# Patient Record
Sex: MALE | Race: WHITE | NOT HISPANIC OR LATINO | Employment: FULL TIME | URBAN - METROPOLITAN AREA
[De-identification: names, ages, dates, MRNs, and addresses within clinical notes are randomized per-mention and may not be internally consistent; named-entity substitution may affect disease eponyms.]

---
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
## 2021-09-30 ENCOUNTER — OFFICE VISIT (OUTPATIENT)
Dept: FAMILY MEDICINE | Facility: CLINIC | Age: 21
End: 2021-09-30
Payer: COMMERCIAL

## 2021-09-30 VITALS
HEART RATE: 78 BPM | SYSTOLIC BLOOD PRESSURE: 119 MMHG | TEMPERATURE: 98.5 F | BODY MASS INDEX: 23.89 KG/M2 | OXYGEN SATURATION: 95 % | WEIGHT: 176.4 LBS | DIASTOLIC BLOOD PRESSURE: 66 MMHG | HEIGHT: 72 IN | RESPIRATION RATE: 16 BRPM

## 2021-09-30 DIAGNOSIS — Z00.00 ANNUAL PHYSICAL EXAM: Primary | ICD-10-CM

## 2021-09-30 DIAGNOSIS — Z23 HIGH PRIORITY FOR 2019-NCOV VACCINE: ICD-10-CM

## 2021-09-30 PROCEDURE — 91301 COVID-19,PF,MODERNA (18+ YRS): CPT | Performed by: INTERNAL MEDICINE

## 2021-09-30 PROCEDURE — 0011A COVID-19,PF,MODERNA (18+ YRS): CPT | Performed by: INTERNAL MEDICINE

## 2021-09-30 PROCEDURE — 99385 PREV VISIT NEW AGE 18-39: CPT | Mod: 25 | Performed by: INTERNAL MEDICINE

## 2021-09-30 ASSESSMENT — ENCOUNTER SYMPTOMS
DIZZINESS: 0
COUGH: 0
WEAKNESS: 1
HEADACHES: 1
FEVER: 0
DIARRHEA: 0
NAUSEA: 0
NERVOUS/ANXIOUS: 0
JOINT SWELLING: 0
MYALGIAS: 0
SHORTNESS OF BREATH: 0
HEMATURIA: 0
PALPITATIONS: 0
DYSURIA: 0
ABDOMINAL PAIN: 0
SORE THROAT: 0
CONSTIPATION: 0
HEARTBURN: 1
ARTHRALGIAS: 0
PARESTHESIAS: 0
CHILLS: 0
FREQUENCY: 0
HEMATOCHEZIA: 0
EYE PAIN: 0

## 2021-09-30 ASSESSMENT — MIFFLIN-ST. JEOR: SCORE: 1838.15

## 2021-09-30 NOTE — PROGRESS NOTES
SUBJECTIVE:   CC: Pete Polanco is an 21 year old male who presents for preventative health visit.     Pete is a 21 year old male who presented to the clinic for a physical.  2 weeks ago he had an episode of dizziness/shakiness that lasted about 10 hours.  He reports that his dad gets the same feeling and is usually because of low blood sugars, of note his dad is not a diabetic or on insulin.  During the episode of patient ate a sugar cookie and was feeling much better.  He reports that he sometimes skips meals and is snacking throughout the day.  He works manual labor.  His medical history insignificant.  No past surgical history.  He is a smoker, smokes half pack a day for multiple years, on and off with quitting, not interested in quitting at this time.  He drinks 6-8 alcoholic drinks every week.  He is sexually active and does not use condoms.      Patient has been advised of split billing requirements and indicates understanding: Yes  Healthy Habits:     Getting at least 3 servings of Calcium per day:  NO    Bi-annual eye exam:  Yes    Dental care twice a year:  NO    Sleep apnea or symptoms of sleep apnea:  None    Diet:  Regular (no restrictions)    Frequency of exercise:  1 day/week    Duration of exercise:  45-60 minutes    Taking medications regularly:  Not Applicable    Medication side effects:  Not applicable    PHQ-2 Total Score: 0    Additional concerns today:  No      Today's PHQ-2 Score:   PHQ-2 ( 1999 Pf[employer]) 9/30/2021   Q1: Little interest or pleasure in doing things 0   Q2: Feeling down, depressed or hopeless 0   PHQ-2 Score 0   Q1: Little interest or pleasure in doing things Not at all   Q2: Feeling down, depressed or hopeless Not at all   PHQ-2 Score 0     Abuse: Current or Past(Physical, Sexual or Emotional)- No  Do you feel safe in your environment? Yes    Social History     Tobacco Use     Smoking status: Current Some Day Smoker     Smokeless tobacco: Never Used   Substance Use Topics      Alcohol use: No         Alcohol Use 9/30/2021   Prescreen: >3 drinks/day or >7 drinks/week? No       Last PSA: No results found for: PSA    Reviewed orders with patient. Reviewed health maintenance and updated orders accordingly - Yes  Lab work is in process    Reviewed and updated as needed this visit by clinical staff               Reviewed and updated as needed this visit by Provider                Review of Systems   Constitutional: Negative for chills and fever.   HENT: Negative for congestion, ear pain, hearing loss and sore throat.    Eyes: Negative for pain and visual disturbance.   Respiratory: Negative for cough and shortness of breath.    Cardiovascular: Negative for chest pain, palpitations and peripheral edema.   Gastrointestinal: Positive for heartburn. Negative for abdominal pain, constipation, diarrhea, hematochezia and nausea.   Genitourinary: Negative for discharge, dysuria, frequency, genital sores, hematuria, impotence and urgency.   Musculoskeletal: Negative for arthralgias, joint swelling and myalgias.   Skin: Negative for rash.   Neurological: Positive for weakness and headaches. Negative for dizziness and paresthesias.   Psychiatric/Behavioral: Negative for mood changes. The patient is not nervous/anxious.        OBJECTIVE:   There were no vitals taken for this visit.    Physical Exam  Vitals reviewed.   Constitutional:       Appearance: Normal appearance.   HENT:      Right Ear: Tympanic membrane normal. There is impacted cerumen.      Left Ear: Tympanic membrane normal. There is impacted cerumen.      Mouth/Throat:      Mouth: Mucous membranes are moist.      Pharynx: Oropharynx is clear. No oropharyngeal exudate or posterior oropharyngeal erythema.   Cardiovascular:      Rate and Rhythm: Normal rate and regular rhythm.      Heart sounds: Normal heart sounds. No murmur heard.   No gallop.    Pulmonary:      Effort: Pulmonary effort is normal. No respiratory distress.      Breath sounds: No  "wheezing or rales.   Abdominal:      General: Abdomen is flat. There is no distension.      Palpations: Abdomen is soft.      Tenderness: There is no abdominal tenderness. There is no guarding.   Musculoskeletal:         General: Normal range of motion.      Cervical back: Normal range of motion. No rigidity.      Right lower leg: No edema.      Left lower leg: No edema.   Lymphadenopathy:      Cervical: No cervical adenopathy.   Skin:     General: Skin is warm and dry.   Neurological:      General: No focal deficit present.      Mental Status: He is alert and oriented to person, place, and time.   Psychiatric:         Mood and Affect: Mood normal.         Behavior: Behavior normal.         ASSESSMENT/PLAN:       ICD-10-CM    1. Annual physical exam  Z00.00 CBC with Platelets (Today)     COMPREHENSIVE METABOLIC PANEL     Lipid Profile     TSH with free T4 reflex       Annual physical exam  Lab work ordered.  Pete will get his first Covid shot today.  Discussed about quitting smoking and safe sex practices.  -     CBC with Platelets (Today); Future  -     COMPREHENSIVE METABOLIC PANEL; Future  -     Lipid Profile; Future  -     TSH with free T4 reflex; Future        Patient has been advised of split billing requirements and indicates understanding: Yes  COUNSELING:   Reviewed preventive health counseling, as reflected in patient instructions       Safe sex practices/STD prevention    Estimated body mass index is 21.18 kg/m  as calculated from the following:    Height as of 3/21/18: 1.88 m (6' 2\").    Weight as of 3/21/18: 74.8 kg (165 lb).      He reports that he has been smoking. He has never used smokeless tobacco.  Tobacco Cessation Action Plan:   Information offered: Patient not interested at this time      Counseling Resources:  ATP IV Guidelines  Pooled Cohorts Equation Calculator  FRAX Risk Assessment  ICSI Preventive Guidelines  Dietary Guidelines for Americans, 2010  USDA's MyPlate  ASA Prophylaxis  Lung CA " Screening    RONEL REEDER MD  [facility]

## 2021-09-30 NOTE — PATIENT INSTRUCTIONS
You were seen in [facility] today for an annual physical (preventive) visit. I ordered blood work which includes blood count, electrolytes, kidney function, thyroid function, lipid profile and some screening tests. 8-12 hours of fasting is required for checking lipids. You will get a mychart message or a call about test results.

## 2021-10-28 ENCOUNTER — IMMUNIZATION (OUTPATIENT)
Dept: NURSING | Facility: CLINIC | Age: 21
End: 2021-10-28
Attending: INTERNAL MEDICINE
Payer: COMMERCIAL

## 2021-10-28 DIAGNOSIS — Z23 HIGH PRIORITY FOR 2019-NCOV VACCINE: ICD-10-CM

## 2021-10-28 PROCEDURE — 0012A COVID-19,PF,MODERNA (18+ YRS PRIMARY SERIES .5ML): CPT

## 2021-10-28 PROCEDURE — 99207 PR NO CHARGE NURSE ONLY: CPT

## 2021-10-28 PROCEDURE — 91301 COVID-19,PF,MODERNA (18+ YRS PRIMARY SERIES .5ML): CPT

## 2023-07-01 ENCOUNTER — NURSE TRIAGE (OUTPATIENT)
Dept: NURSING | Facility: CLINIC | Age: 23
End: 2023-07-01
Payer: COMMERCIAL

## 2023-07-01 ENCOUNTER — OFFICE VISIT (OUTPATIENT)
Dept: URGENT CARE | Facility: URGENT CARE | Age: 23
End: 2023-07-01
Payer: COMMERCIAL

## 2023-07-01 VITALS
OXYGEN SATURATION: 99 % | SYSTOLIC BLOOD PRESSURE: 115 MMHG | TEMPERATURE: 97.8 F | WEIGHT: 183 LBS | DIASTOLIC BLOOD PRESSURE: 62 MMHG | HEART RATE: 57 BPM | BODY MASS INDEX: 24.82 KG/M2

## 2023-07-01 DIAGNOSIS — R21 RASH AND NONSPECIFIC SKIN ERUPTION: Primary | ICD-10-CM

## 2023-07-01 PROCEDURE — 99213 OFFICE O/P EST LOW 20 MIN: CPT | Performed by: EMERGENCY MEDICINE

## 2023-07-01 RX ORDER — BETAMETHASONE DIPROPIONATE 0.5 MG/G
LOTION TOPICAL 2 TIMES DAILY
Qty: 60 ML | Refills: 0 | Status: SHIPPED | OUTPATIENT
Start: 2023-07-01 | End: 2023-07-08

## 2023-07-01 NOTE — PROGRESS NOTES
CHIEF COMPLAINT: Rash right food      HPI: 2 day history of localized bumps plantar aspect of right foot. Concerned for plantar warts.Works in heat.      ROS:see above    No Known Allergies   Current Outpatient Medications   Medication Sig Dispense Refill     betamethasone dipropionate (DIPROSONE) 0.05 % external lotion Apply topically 2 times daily for 7 days 60 mL 0         PE:  NAD, 3-4 small punctate  areas of vesicles with redness      TREATMENTnone    ASSESSMENT: nonspecific. Anhydrosis?    DIAGNOSIS:Rash      PLAN:Betamethasone and feet care discussed.  Follow up derm in 1-2 weeks prn

## 2023-07-01 NOTE — TELEPHONE ENCOUNTER
Patient noticed he had some sores on the bottom of his foot.  He states he thought they were planters warts.  Patient states they are red, with a white head like bump on the top.  There is about 4 of them.    Patient denies fever.    Patient also states he was told that planters wart are associated with HPV.    Care advise: be seen in 24 hours.  Recommended , gave address to [facility].  Keep clean, put antibiotic ointment on the sores.    Mag Condon RN   07/01/23 2:01 PM  [facility] Nurse Advisor    Reason for Disposition    Looks like a boil, infected sore, or deep ulcer    Additional Information    Negative: [1] Looks infected AND [2] large red area (> 2 in. or 5 cm)    Negative: [1] Fever AND [2] bump is tender to touch    Negative: [1] Fever AND [2] spreading red area or streak    Negative: [1] Looks infected (spreading redness, pus) AND [2] no fever    Protocols used: SKIN LESION - MOLES OR GROWTHS-A-AH

## 2023-07-01 NOTE — PATIENT INSTRUCTIONS
After work, wash and dry well. Keep open to air  Steroid cream  Return if worse or no better in one week.